# Patient Record
(demographics unavailable — no encounter records)

---

## 2024-10-23 NOTE — HISTORY OF PRESENT ILLNESS
[FreeTextEntry1] : Mellissa initially felt lethargic on increase dose of metoprolol but now feels great. Onlyif insomnia then whole pill in AM makes her sleepy but overall has not felt this well in a long time. [place] [facility] [place] [employer] [place] recently and for the first time did not have any symptoms.

## 2024-10-23 NOTE — DISCUSSION/SUMMARY
[FreeTextEntry1] : The patient is a 63-year-old anxious female HTN, HL, whose palpitations are now well controlled.  #1 CV- normal ECG, ECHO MVP, c/w metoprolol 25mgAM and 12.5mg PM. #2 HTN- reactive, now improved #3 HLD- Mediterranean diet and regular daily exercise such as walking after dinner. #4 Anxiety- stress mgmt and alprazolam prn. [EKG obtained to assist in diagnosis and management of assessed problem(s)] : EKG obtained to assist in diagnosis and management of assessed problem(s)

## 2024-11-22 NOTE — HEALTH RISK ASSESSMENT
[Very Good] : ~his/her~  mood as very good [1 or 2 (0 pts)] : 1 or 2 (0 points) [Never (0 pts)] : Never (0 points) [No] : In the past 12 months have you used drugs other than those required for medical reasons? No [No falls in past year] : Patient reported no falls in the past year [0] : 2) Feeling down, depressed, or hopeless: Not at all (0) [Never] : Never [0-4] : 0-4 [HIV test declined] : HIV test declined [None] : None [With Significant Other] : lives with significant other [Employed] : employed [College] : College [Feels Safe at Home] : Feels safe at home [With Patient/Caregiver] : , with patient/caregiver [Audit-CScore] : 0 [[id number]] : 0 [Change in mental status noted] : No change in mental status noted [Language] : denies difficulty with language [Behavior] : denies difficulty with behavior [Learning/Retaining New Information] : denies difficulty learning/retaining new information [Handling Complex Tasks] : denies difficulty handling complex tasks [Reasoning] : denies difficulty with reasoning [Spatial Ability and Orientation] : denies difficulty with spatial ability and orientation [Sexually Active] : not sexually active [High Risk Behavior] : no high risk behavior [Reports changes in hearing] : Reports no changes in hearing [Reports changes in vision] : Reports no changes in vision [Reports normal functional visual acuity (ie: able to read med bottle)] : Reports poor functional visual acuity.  [Reports changes in dental health] : Reports no changes in dental health [Smoke Detector] : no smoke detector [Carbon Monoxide Detector] : no carbon monoxide detector [Safety elements used in home] : no safety elements used in home [Seat Belt] : does not use seat belt [Sunscreen] : does not use sunscreen [Travel to Developing Areas] : does not  travel to developing areas [TB Exposure] : is not being exposed to tuberculosis [Caregiver Concerns] : does not have caregiver concerns [PapSmearDate] : 2023 [ColonoscopyDate] : 2015 [AdvancecareDate] : 11/2024

## 2024-11-22 NOTE — PHYSICAL EXAM
[No Acute Distress] : no acute distress [Normal Sclera/Conjunctiva] : normal sclera/conjunctiva [Normal Outer Ear/Nose] : the outer ears and nose were normal in appearance [No Edema] : there was no peripheral edema [Normal] : soft, non-tender, non-distended, no masses palpated, no HSM and normal bowel sounds [No Rash] : no rash [Coordination Grossly Intact] : coordination grossly intact [No Focal Deficits] : no focal deficits [Alert and Oriented x3] : oriented to person, place, and time

## 2024-11-22 NOTE — ASSESSMENT
[FreeTextEntry1] : CPE OF 63 Y OLD FEM = LABS TODAY  HAD INFLUENZA VACCINE ;DECLINES COVID-19  RTO 1 Y

## 2025-04-30 NOTE — HISTORY OF PRESENT ILLNESS
[FreeTextEntry1] : 63-year-old anxious female HTN, HLD, with palpitations under much better control. Now able to exercise. BP home under 135 all the time. Feels markedly better. Asking about CoQ10. Does not want to take statin.

## 2025-04-30 NOTE — DISCUSSION/SUMMARY
[FreeTextEntry1] : The patient is a 63-year-old anxious female HTN, HLD, whose palpitations are now well controlled.  #1 CV- normal ECG, ECHO MVP, c/w metoprolol 25mgAM and 12.5mg PM. #2 HTN- reactive, now improved #3 HLD- Mediterranean diet and regular daily exercise such as walking after dinner., Discussed trend, lipids and Lpa today, discussed possible cardiac CT vs calcium score if needed. #4 Anxiety- stress mgmt and alprazolam prn. [EKG obtained to assist in diagnosis and management of assessed problem(s)] : EKG obtained to assist in diagnosis and management of assessed problem(s)